# Patient Record
Sex: MALE | Race: WHITE | Employment: FULL TIME | URBAN - METROPOLITAN AREA
[De-identification: names, ages, dates, MRNs, and addresses within clinical notes are randomized per-mention and may not be internally consistent; named-entity substitution may affect disease eponyms.]

---

## 2023-02-07 ENCOUNTER — HOSPITAL ENCOUNTER (OUTPATIENT)
Dept: CT IMAGING | Age: 53
Discharge: HOME | End: 2023-02-07
Attending: FAMILY MEDICINE

## 2023-02-07 DIAGNOSIS — Z13.6 ENCOUNTER FOR SCREENING FOR CARDIOVASCULAR DISORDERS: ICD-10-CM

## 2023-02-07 NOTE — PROGRESS NOTES
Date of Service 2/7/2023    Perla Puente  Date of Birth 2/26/1970    Patient Age: 47year old    PCP: Chau Solis,   [address] [id number]    CARDIOLOGIST:  Dr. Paul eFliciano Cardiologist  [address] [id number]  [phone]    Consult Type  Type Scan/Screening: Heart Scan  Preliminary Heart Scan Score: 103              Previous Lipid Profile  No Lipid results on file in last 5 years . Nurse Review  Risk factor information and results reviewed with Nurse: Yes   (Discussed Preliminary Heart Scan report with both the patient and spouse.)    Recommended Follow Up:  Consult your physician regarding[de-identified]   Final Heart Scan Report; Discuss potential for Incidental Finding      Recommendations for Change:  Nutrition Changes: Low Saturated Fat;Low Fat Dairy; Increase Fiber     Cholesterol Modification (goal of therapy depends upon your risk): Increase HDL (Healthy/Good) Normal >45 Men >55 Women;  Decrease Triglycerides (Ugly) Normal <150    (Today's NON-FASTING Cholestech Values: Total Cholesterol-140, HDL-32, LDL-62, Triglycerides-229, Glucose-130)    Exercise: Enhance Current Program     Weight Management: Decrease Current Weight     Repeat Heart Scan:   3 Years if Calcium Score is > 0.0; Discuss with your Physician          Carolyn Recommended Resources:  Recommended Resources: Upcoming Classes, Medical Services and Health Library www. [email or web address]. Harry Cheema RN        Please Contact the Nurse Heart Line with any Questions or Concerns [phone].